# Patient Record
(demographics unavailable — no encounter records)

---

## 2024-10-24 NOTE — HISTORY OF PRESENT ILLNESS
[0] : currently ~his/her~ pain is 0 out of 10 [FreeTextEntry1] : 7 yo female presents with father for evaluation of intermittent foot and leg pain for approx 1 year. Father states she usually complains after prolonged running or walking activity. It hasnt happened recently but it was brought up to the pediatrician and felt she had some flat feet and recommended ortho or podiatry. She remains active even if the complaint of pain is present. No injury. Both feet/legs have been painful in the past. No swelling noted. No night pain.

## 2024-10-24 NOTE — ASSESSMENT
[FreeTextEntry1] : Mild intermittent foot and ankle pain after activity Hypermobility ankles  The history for today's visit was obtained from the child, as well as the parent. The child's history was unreliable alone due to age and therefore, the parent was used today as an independent historian. The above was discussed at length with parent. Hypermobility can be a cause of fatigue after prolonged or vigorous activity. Father instructed on trying OTC arch supports to see if this improves the fatigue ([employer]).  If pain persists or worsens, a course of PT would be the next step to help improve strength and symptoms She will f/u with us on a PRN basis Activity as tolerated All questions answered. Parent in agreement with the plan. I, Lenka Sweeney, MPAS, PAC, have acted as a scribe and documented the above for Dr. Clayton.  The above documentation completed by the scribe is an accurate record of both my words and actions.  ADAMS

## 2024-10-24 NOTE — PHYSICAL EXAM
[FreeTextEntry1] : GAIT: No limp. Good coordination and balance noted. Neutral foot progression angles GENERAL: alert, cooperative pleasant young 5 yo female in NAD SKIN: The skin is intact, warm, pink and dry over the area examined. EYES: Normal conjunctiva, normal eyelids and pupils were equal and round. ENT: normal ears, normal nose and normal lips. CARDIOVASCULAR: brisk capillary refill, but no peripheral edema. RESPIRATORY: The patient is in no apparent respiratory distress. They're taking full deep breaths without use of accessory muscles or evidence of audible wheezes or stridor without the use of a stethoscope. Normal respiratory effort. ABDOMEN: not examined   LOWER extremity: Neutral alignment of the lower extremities. No LLD Hips full flexion and extension. Wide symmetrical abduction. Neg galleazzi. Symmetrical IR and ER. Knee: full flexion and extension. No effusion. No tenderness to palpation. No instability to stress TIBIA: no tenderness to palpation. No sts or erythema.  PA: 10 degrees TFA neutral bilaterally. Ankle/foot: arch present at rest. mild callous noted cuboid and talus c/w hypermobility ankles. DF 40-50 with knee flexed bilaterally upon standing, mild pes planus noted. Recreates hindfoot varus with toe raise.  Motor strength 5/5, sensation grossly intact, brisk cap refill Reflexes symmetrical . Neg babinski, neg clonus

## 2024-10-24 NOTE — REASON FOR VISIT
[Initial Evaluation] : an initial evaluation [Patient] : patient [Father] : father [FreeTextEntry1] : intermittent foot and leg pain

## 2024-10-24 NOTE — REVIEW OF SYSTEMS
[Joint Pains] : arthralgias [Appropriate Age Development] : development appropriate for age [Change in Activity] : no change in activity [Rash] : no rash [Heart Problems] : no heart problems [Congestion] : no congestion [Feeding Problem] : no feeding problem [Joint Swelling] : no joint swelling [Sleep Disturbances] : ~T no sleep disturbances

## 2024-10-24 NOTE — HISTORY OF PRESENT ILLNESS
[0] : currently ~his/her~ pain is 0 out of 10 [FreeTextEntry1] : 5 yo female presents with father for evaluation of intermittent foot and leg pain for approx 1 year. Father states she usually complains after prolonged running or walking activity. It hasnt happened recently but it was brought up to the pediatrician and felt she had some flat feet and recommended ortho or podiatry. She remains active even if the complaint of pain is present. No injury. Both feet/legs have been painful in the past. No swelling noted. No night pain.

## 2024-10-24 NOTE — PHYSICAL EXAM
[FreeTextEntry1] : GAIT: No limp. Good coordination and balance noted. Neutral foot progression angles GENERAL: alert, cooperative pleasant young 7 yo female in NAD SKIN: The skin is intact, warm, pink and dry over the area examined. EYES: Normal conjunctiva, normal eyelids and pupils were equal and round. ENT: normal ears, normal nose and normal lips. CARDIOVASCULAR: brisk capillary refill, but no peripheral edema. RESPIRATORY: The patient is in no apparent respiratory distress. They're taking full deep breaths without use of accessory muscles or evidence of audible wheezes or stridor without the use of a stethoscope. Normal respiratory effort. ABDOMEN: not examined   LOWER extremity: Neutral alignment of the lower extremities. No LLD Hips full flexion and extension. Wide symmetrical abduction. Neg galleazzi. Symmetrical IR and ER. Knee: full flexion and extension. No effusion. No tenderness to palpation. No instability to stress TIBIA: no tenderness to palpation. No sts or erythema.  PA: 10 degrees TFA neutral bilaterally. Ankle/foot: arch present at rest. mild callous noted cuboid and talus c/w hypermobility ankles. DF 40-50 with knee flexed bilaterally upon standing, mild pes planus noted. Recreates hindfoot varus with toe raise.  Motor strength 5/5, sensation grossly intact, brisk cap refill Reflexes symmetrical . Neg babinski, neg clonus